# Patient Record
Sex: MALE | Race: WHITE
[De-identification: names, ages, dates, MRNs, and addresses within clinical notes are randomized per-mention and may not be internally consistent; named-entity substitution may affect disease eponyms.]

---

## 2017-02-02 ENCOUNTER — HOSPITAL ENCOUNTER (OUTPATIENT)
Dept: HOSPITAL 53 - M LAB REF | Age: 52
End: 2017-02-02
Attending: INTERNAL MEDICINE
Payer: COMMERCIAL

## 2017-02-02 DIAGNOSIS — C09.9: Primary | ICD-10-CM

## 2017-02-02 LAB — T4 FREE SERPL-MCNC: 0.9 NG/DL (ref 0.76–1.46)

## 2017-05-05 ENCOUNTER — HOSPITAL ENCOUNTER (OUTPATIENT)
Dept: HOSPITAL 53 - M LAB REF | Age: 52
End: 2017-05-05
Attending: PHYSICIAN ASSISTANT
Payer: COMMERCIAL

## 2017-05-05 DIAGNOSIS — Z79.899: Primary | ICD-10-CM

## 2017-05-05 LAB
ALT SERPL W P-5'-P-CCNC: 29 U/L (ref 12–78)
AST SERPL-CCNC: 40 U/L (ref 15–37)
BASOPHILS # BLD AUTO: 0 K/MM3 (ref 0–0.2)
BASOPHILS NFR BLD AUTO: 1.4 % (ref 0–1)
EOSINOPHIL # BLD AUTO: 0.2 K/MM3 (ref 0–0.5)
EOSINOPHIL NFR BLD AUTO: 6 % (ref 0–3)
ERYTHROCYTE [DISTWIDTH] IN BLOOD BY AUTOMATED COUNT: 12.6 % (ref 11.5–14.5)
LARGE UNSTAINED CELL #: 0.1 K/MM3 (ref 0–0.4)
LARGE UNSTAINED CELL %: 2.3 % (ref 0–4)
LYMPHOCYTES # BLD AUTO: 0.6 K/MM3 (ref 1.5–4.5)
LYMPHOCYTES NFR BLD AUTO: 17.1 % (ref 24–44)
MCH RBC QN AUTO: 33.3 PG (ref 27–33)
MCHC RBC AUTO-ENTMCNC: 33.9 G/DL (ref 32–36.5)
MCV RBC AUTO: 98.1 FL (ref 80–96)
MONOCYTES # BLD AUTO: 0.2 K/MM3 (ref 0–0.8)
MONOCYTES NFR BLD AUTO: 7 % (ref 0–5)
NEUTROPHILS # BLD AUTO: 1.9 K/MM3 (ref 1.8–7.7)
NEUTROPHILS NFR BLD AUTO: 66.1 % (ref 36–66)
PLATELET # BLD AUTO: 184 K/MM3 (ref 150–450)
WBC # BLD AUTO: 2.8 K/MM3 (ref 4–10)

## 2017-06-12 ENCOUNTER — HOSPITAL ENCOUNTER (OUTPATIENT)
Dept: HOSPITAL 53 - M OPP | Age: 52
End: 2017-06-12
Attending: INTERNAL MEDICINE
Payer: COMMERCIAL

## 2017-06-12 VITALS — BODY MASS INDEX: 19.66 KG/M2 | WEIGHT: 118 LBS | HEIGHT: 65 IN

## 2017-06-12 VITALS — SYSTOLIC BLOOD PRESSURE: 190 MMHG | DIASTOLIC BLOOD PRESSURE: 92 MMHG

## 2017-06-12 DIAGNOSIS — Z85.9: ICD-10-CM

## 2017-06-12 DIAGNOSIS — Z79.899: ICD-10-CM

## 2017-06-12 DIAGNOSIS — K64.0: ICD-10-CM

## 2017-06-12 DIAGNOSIS — Z92.21: ICD-10-CM

## 2017-06-12 DIAGNOSIS — Z87.891: ICD-10-CM

## 2017-06-12 DIAGNOSIS — Z12.11: Primary | ICD-10-CM

## 2017-06-12 DIAGNOSIS — Z92.3: ICD-10-CM

## 2017-06-12 DIAGNOSIS — R29.818: ICD-10-CM

## 2017-06-12 NOTE — ROOR
________________________________________________________________________________

Patient Name: Herrera Malone              Procedure Date: 6/12/2017 1:26 PM

MRN: [id number]                          Account Number: [id number]

YOB: 1965               Age: 52

Room: [facility]                            Gender: Male

Note Status: Finalized                 

________________________________________________________________________________

 

Procedure:           Total Colonoscopy to Cecum

Indications:         Screening for colorectal malignant neoplasm

Providers:           Juan Page MD

Referring MD:        Mandeep Chacon MD

Requesting Provider: 

Medicines:           Monitored Anesthesia Care

Complications:       No immediate complications.

________________________________________________________________________________

Procedure:           Pre-Anesthesia Assessment:

                     - The heart rate, respiratory rate, oxygen saturations, 

                     blood pressure, adequacy of pulmonary ventilation, and 

                     response to care were monitored throughout the procedure.

                     The Colonoscope was introduced through the anus and 

                     advanced to the cecum, identified by appendiceal orifice 

                     and ileocecal valve. The colonoscopy was performed 

                     without difficulty. The patient tolerated the procedure 

                     well. The quality of the bowel preparation was excellent.

                                                                                

Findings:

     The perianal and digital rectal examinations were normal.

     Non-bleeding internal hemorrhoids were found during retroflexion. The 

     hemorrhoids were small and Grade I (internal hemorrhoids that do not 

     prolapse).

     No other significant abnormalities were identified in a careful 

     examination of the remainder of the colon.

     The exam was otherwise without abnormality.

                                                                                

Impression:          - Non-bleeding internal hemorrhoids.

                     - The examination was otherwise normal.

                     - No specimens collected.

                     - The exam was otherwise normal to the cecum.

Recommendation:      - Patient has a contact number available for emergencies. 

                     The signs and symptoms of potential delayed complications 

                     were discussed with the patient. Return to normal 

                     activities tomorrow. Written discharge instructions were 

                     provided to the patient.

                     - Discharge patient to home.

                     - Continue present medications.

                     - Repeat colonoscopy in 10 years for screening purposes.

                     - Return to referring physician.

                     - The findings and recommendations were discussed with 

                     the patient's family.

                                                                                

 

Juan Page MD

____________________

Juan Page MD

6/12/2017 1:49:38 PM

This report has been signed electronically.

Number of Addenda: 0

 

Note Initiated On: 6/12/2017 1:26 PM

Estimated Blood Loss:

     Estimated blood loss: none.

## 2017-06-20 ENCOUNTER — HOSPITAL ENCOUNTER (OUTPATIENT)
Dept: HOSPITAL 53 - M LAB REF | Age: 52
End: 2017-06-20
Attending: INTERNAL MEDICINE
Payer: COMMERCIAL

## 2017-06-20 DIAGNOSIS — C09.9: Primary | ICD-10-CM

## 2017-06-20 LAB — T4 FREE SERPL-MCNC: 0.9 NG/DL (ref 0.76–1.46)

## 2017-08-15 ENCOUNTER — HOSPITAL ENCOUNTER (OUTPATIENT)
Dept: HOSPITAL 53 - M LABNEURO | Age: 52
End: 2017-08-15
Attending: FAMILY MEDICINE
Payer: COMMERCIAL

## 2017-08-15 DIAGNOSIS — I10: Primary | ICD-10-CM

## 2017-08-15 LAB
ALBUMIN SERPL BCG-MCNC: 4.2 GM/DL (ref 3.2–5.2)
ALBUMIN/GLOB SERPL: 1.24 {RATIO} (ref 1–1.93)
ALP SERPL-CCNC: 123 U/L (ref 45–117)
ALT SERPL W P-5'-P-CCNC: 39 U/L (ref 12–78)
ANION GAP SERPL CALC-SCNC: 6 MEQ/L (ref 8–16)
AST SERPL-CCNC: 37 U/L (ref 15–37)
BILIRUB SERPL-MCNC: 0.8 MG/DL (ref 0.2–1)
BUN SERPL-MCNC: 15 MG/DL (ref 7–18)
CALCIUM SERPL-MCNC: 9.2 MG/DL (ref 8.5–10.1)
CHLORIDE SERPL-SCNC: 93 MEQ/L (ref 98–107)
CHOLEST SERPL-MCNC: 173 MG/DL (ref ?–200)
CO2 SERPL-SCNC: 30 MEQ/L (ref 21–32)
CREAT SERPL-MCNC: 1.32 MG/DL (ref 0.7–1.3)
ERYTHROCYTE [DISTWIDTH] IN BLOOD BY AUTOMATED COUNT: 12.6 % (ref 11.5–14.5)
GFR SERPL CREATININE-BSD FRML MDRD: > 60 ML/MIN/{1.73_M2} (ref 56–?)
GLUCOSE SERPL-MCNC: 88 MG/DL (ref 70–105)
MCH RBC QN AUTO: 34.6 PG (ref 27–33)
MCHC RBC AUTO-ENTMCNC: 34.5 G/DL (ref 32–36.5)
MCV RBC AUTO: 100.3 FL (ref 80–96)
PLATELET # BLD AUTO: 166 K/MM3 (ref 150–450)
POTASSIUM SERPL-SCNC: 4.3 MEQ/L (ref 3.5–5.1)
PROT SERPL-MCNC: 7.6 GM/DL (ref 6.4–8.2)
SODIUM SERPL-SCNC: 129 MEQ/L (ref 136–145)
TRIGL SERPL-MCNC: 58 MG/DL (ref ?–150)
WBC # BLD AUTO: 4.2 K/MM3 (ref 4–10)

## 2018-07-24 ENCOUNTER — HOSPITAL ENCOUNTER (OUTPATIENT)
Dept: HOSPITAL 53 - M LABNEURO | Age: 53
End: 2018-07-24
Attending: PHYSICIAN ASSISTANT
Payer: COMMERCIAL

## 2018-07-24 DIAGNOSIS — G40.909: Primary | ICD-10-CM

## 2018-07-24 PROCEDURE — 36415 COLL VENOUS BLD VENIPUNCTURE: CPT

## 2018-07-27 LAB
LAMOTRIGINE SERPL-MCNC: 12.9 UG/ML (ref 2–20)
LEVETIRACETAM SERPL-MCNC: 40.7 UG/ML (ref 10–40)

## 2018-08-07 ENCOUNTER — HOSPITAL ENCOUNTER (OUTPATIENT)
Dept: HOSPITAL 53 - M LAB REF | Age: 53
End: 2018-08-07
Payer: COMMERCIAL

## 2018-08-07 DIAGNOSIS — E07.9: Primary | ICD-10-CM

## 2018-08-07 LAB
FREE T4: 0.81 NG/DL (ref 0.76–1.46)
THYROID STIMULATING HORMONE: 54 UIU/ML (ref 0.36–3.74)

## 2018-08-07 PROCEDURE — 84443 ASSAY THYROID STIM HORMONE: CPT

## 2018-08-14 ENCOUNTER — HOSPITAL ENCOUNTER (OUTPATIENT)
Dept: HOSPITAL 53 - M LAB REF | Age: 53
End: 2018-08-14
Payer: COMMERCIAL

## 2018-08-14 DIAGNOSIS — D64.9: Primary | ICD-10-CM

## 2018-08-14 LAB
FERRITIN: 189 NG/ML (ref 26–388)
HEMATOCRIT: 31.6 % (ref 42–52)
IRON (FE): 126 UG/DL (ref 65–175)
IRON SATN MFR SERPL: 39 % (ref 19.7–50)
RETIC HEMOGLOBIN EQUIVALENT: 38.6 PG (ref 24–36)
RETICS/RBC NFR: 0.6 % (ref 0.5–1.5)
RETICULOCYTE #: 19.1 10^9/L (ref 17–77)
SOURCE: (no result)
TOTAL IRON BINDING CAPACITY: 323 UG/DL (ref 250–450)
TOTAL PROTEIN: 7.5 GM/DL (ref 6.4–8.2)
VITAMIN B12 LEVEL: 319 PG/ML (ref 247–911)

## 2018-08-14 PROCEDURE — 83550 IRON BINDING TEST: CPT

## 2018-08-15 LAB
PRETREATED FOLATE FOR RBCFOL: 11.9 NG/ML
RBC FOLATE: 790.8 NG/ML (ref 280–791)

## 2018-08-16 LAB
ALBUMIN %: 62.9 % (ref 55.8–66.1)
ALBUMIN: 4.72 GM/DL (ref 3.29–5.55)
ALPHA-1-GLOBULIN %: 4 % (ref 2.9–4.9)
ALPHA-1-GLOBULINS: 0.3 GM/DL (ref 0.17–0.41)
ALPHA-2-GLOBULINS %: 8.9 % (ref 7.1–11.8)
ALPHA-2-GLOBULINS: 0.67 GM/DL (ref 0.42–0.99)
B-GLOBULIN SERPL ELPH-MCNC: 0.46 GM/DL (ref 0.28–0.6)
BETA1 GLOB MFR SERPL ELPH: 6.1 % (ref 4.7–7.2)
BETA2 GLOB MFR SERPL ELPH: 5.4 % (ref 3.2–6.5)
BETA2 GLOB SERPL ELPH-MCNC: 0.41 GM/DL (ref 0.19–0.55)
GAMMA GLOBULIN %: 12.7 % (ref 11.1–18.8)
GAMMA GLOBULINS: 0.95 GM/DL (ref 0.65–1.58)
SPEP INTERPRETATION: (no result)

## 2018-09-26 ENCOUNTER — HOSPITAL ENCOUNTER (OUTPATIENT)
Dept: HOSPITAL 53 - M LABNEURO | Age: 53
End: 2018-09-26
Attending: FAMILY MEDICINE
Payer: COMMERCIAL

## 2018-09-26 DIAGNOSIS — D64.9: Primary | ICD-10-CM

## 2018-09-26 LAB
FERRITIN: 321 NG/ML (ref 26–388)
FOLATE SERPL-MCNC: 6.3 NG/ML
FREE T3: 3.4 PG/ML (ref 2.2–4)
FREE T4: 1.19 NG/DL (ref 0.76–1.46)
IRON (FE): 142 UG/DL (ref 65–175)
IRON SATN MFR SERPL: 44.2 % (ref 19.7–50)
THYROID STIMULATING HORMONE: 6.51 UIU/ML (ref 0.36–3.74)
TOTAL IRON BINDING CAPACITY: 321 UG/DL (ref 250–450)
VIT B12 SERPL-MCNC: 339 PG/ML

## 2018-09-26 PROCEDURE — 82746 ASSAY OF FOLIC ACID SERUM: CPT

## 2019-04-30 ENCOUNTER — HOSPITAL ENCOUNTER (OUTPATIENT)
Dept: HOSPITAL 53 - M LABNEURO | Age: 54
End: 2019-04-30
Attending: FAMILY MEDICINE
Payer: COMMERCIAL

## 2019-04-30 ENCOUNTER — HOSPITAL ENCOUNTER (OUTPATIENT)
Dept: HOSPITAL 53 - M LABNEURO | Age: 54
End: 2019-04-30
Attending: PHYSICIAN ASSISTANT
Payer: COMMERCIAL

## 2019-04-30 DIAGNOSIS — E03.9: Primary | ICD-10-CM

## 2019-04-30 DIAGNOSIS — G40.89: Primary | ICD-10-CM

## 2019-04-30 LAB
T3FREE SERPL-MCNC: 2.6 PG/ML (ref 2.2–4)
T4 FREE SERPL-MCNC: 1.08 NG/DL (ref 0.76–1.46)
TSH SERPL DL<=0.005 MIU/L-ACNC: 10.4 UIU/ML (ref 0.36–3.74)

## 2019-05-03 LAB
LAMOTRIGINE SERPL-MCNC: 16.1 UG/ML (ref 2–20)
LEVETIRACETAM SERPL-MCNC: 27.3 UG/ML (ref 10–40)

## 2019-08-21 ENCOUNTER — HOSPITAL ENCOUNTER (OUTPATIENT)
Dept: HOSPITAL 53 - M RAD | Age: 54
End: 2019-08-21
Attending: INTERNAL MEDICINE
Payer: COMMERCIAL

## 2019-08-21 DIAGNOSIS — Z12.2: Primary | ICD-10-CM

## 2019-08-21 DIAGNOSIS — F17.210: ICD-10-CM

## 2019-08-21 NOTE — REP
REASON FOR EXAM: Tobacco abuse.

 

PRIORS: None.

 

As per the protocol only lung window images were sent to the read station for

interpretation.

 

There is a nodule in the right lung base which measures approximately 2.2 cm.

When CT images from the CT/PET of 10/19/2016 are reviewed this area represents a

change.

 

IMPRESSION:

 

There is a right lower lobe nodule as described above.  Diagnostic chest CT is

warranted as per the revised Fleischner's Society criteria.

 

 

Electronically Signed by

Ori Mckinley DO 08/21/2019 02:03 P

## 2019-09-03 ENCOUNTER — HOSPITAL ENCOUNTER (OUTPATIENT)
Dept: HOSPITAL 53 - M PLARAD | Age: 54
End: 2019-09-03
Attending: INTERNAL MEDICINE
Payer: COMMERCIAL

## 2019-09-03 DIAGNOSIS — R91.1: Primary | ICD-10-CM

## 2019-09-03 PROCEDURE — 78815 PET IMAGE W/CT SKULL-THIGH: CPT

## 2019-09-04 NOTE — REP
PET/CT:

 

History: Solitary pulmonary nodule. 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

tonsil.

 

Comparisons: Comparison PET/CT  studies are dated October 19, 2016, January 15,

2015, and July 3, 2014. Comparison low-dose chest CT study is from August 21, 2019.  This chest CT showed a 2.2 cm nodular opacity in the right base.

 

TECHNIQUE:

 

64 minutes following the intravenous injection of a 8.25 mCi dose of F-18 FDG,

three-dimensional PET scintigraphy is acquired from the skull base to the

proximal thighs. Triplanar noncontrast CT scanning is acquired through the same

anatomic range for attenuation correction, and image registration with scan

parameters optimized to minimize radiation exposure to the patient. PET

scintigraphy and CT datasets were fused and displayed on a workstation with

multiplanar and projection display capability.

 

PET/CT Findings: The patient is status post radiation therapy to the head and

neck and right neck dissection.  Postoperative fibrotic changes are seen but no

abnormal hypermetabolic uptake is seen in the head and neck region.  There is

some mild skeletal muscle uptake.

 

On today's CT study there is a subtle alveolar infiltrate with multiple

ground-glass opacities in the right upper lobe which is new when compared with

the recent low-dose chest CT and consistent with pneumonia.  There is

non-hypermetabolic but discernible FDG accumulation in this infiltrate, maximum

standard uptake value 2.41.  The ill-defined area of increased density in the

right lower lobe posteriorly appears less nodular and more like an infiltrate

today.  It does show mildly hypermetabolic uptake, maximum standard uptake value

2.72.  No other abnormal hypermetabolic uptake is seen within the chest.

 

In the abdomen and pelvis, there is normal hepatic, splenic, gastrointestinal,

and genitourinary FDG distribution.  No abnormal hypermetabolic uptake is seen in

the abdomen or pelvis.

 

Impression:

 

Right upper and lower lobe opacities with low level FDG accumulation most

compatible with inflammatory disease.  The right lower lobe opacity is mildly

hypermetabolic.  Recommend followup CT scanning.  Post-treatment changes are

noted in the neck.

 

 

Electronically Signed by

Chandan Ayala MD 09/04/2019 12:10 P

## 2019-10-31 ENCOUNTER — HOSPITAL ENCOUNTER (OUTPATIENT)
Dept: HOSPITAL 53 - M WUC | Age: 54
End: 2019-10-31
Attending: PHYSICIAN ASSISTANT
Payer: COMMERCIAL

## 2019-10-31 DIAGNOSIS — R56.9: Primary | ICD-10-CM

## 2019-10-31 DIAGNOSIS — Z79.899: ICD-10-CM

## 2019-11-03 LAB
LAMOTRIGINE SERPL-MCNC: 12.4 UG/ML (ref 2–20)
LEVETIRACETAM SERPL-MCNC: 24.6 UG/ML (ref 10–40)

## 2019-11-08 ENCOUNTER — HOSPITAL ENCOUNTER (OUTPATIENT)
Dept: HOSPITAL 53 - M WUC | Age: 54
End: 2019-11-08
Attending: FAMILY MEDICINE
Payer: COMMERCIAL

## 2019-11-08 DIAGNOSIS — E03.9: Primary | ICD-10-CM

## 2019-11-08 DIAGNOSIS — I10: ICD-10-CM

## 2019-11-08 LAB
ALBUMIN SERPL BCG-MCNC: 3.9 GM/DL (ref 3.2–5.2)
ALT SERPL W P-5'-P-CCNC: 16 U/L (ref 12–78)
BASOPHILS # BLD AUTO: 0.1 10^3/UL (ref 0–0.2)
BASOPHILS NFR BLD AUTO: 2.4 % (ref 0–1)
BILIRUB SERPL-MCNC: 0.8 MG/DL (ref 0.2–1)
BUN SERPL-MCNC: 12 MG/DL (ref 7–18)
CALCIUM SERPL-MCNC: 9 MG/DL (ref 8.5–10.1)
CHLORIDE SERPL-SCNC: 97 MEQ/L (ref 98–107)
CHOLEST SERPL-MCNC: 186 MG/DL (ref ?–200)
CHOLEST/HDLC SERPL: 1.52 {RATIO} (ref ?–5)
CO2 SERPL-SCNC: 33 MEQ/L (ref 21–32)
CREAT SERPL-MCNC: 1.16 MG/DL (ref 0.7–1.3)
EOSINOPHIL # BLD AUTO: 0.1 10^3/UL (ref 0–0.5)
EOSINOPHIL NFR BLD AUTO: 3 % (ref 0–3)
GFR SERPL CREATININE-BSD FRML MDRD: > 60 ML/MIN/{1.73_M2} (ref 56–?)
GLUCOSE SERPL-MCNC: 83 MG/DL (ref 70–100)
HCT VFR BLD AUTO: 29.9 % (ref 42–52)
HDLC SERPL-MCNC: 122 MG/DL (ref 40–?)
HGB BLD-MCNC: 10.2 G/DL (ref 13.5–17.5)
LDLC SERPL CALC-MCNC: 55 MG/DL (ref ?–100)
LYMPHOCYTES # BLD AUTO: 0.7 10^3/UL (ref 1.5–5)
LYMPHOCYTES NFR BLD AUTO: 20.2 % (ref 24–44)
MCH RBC QN AUTO: 32.8 PG (ref 27–33)
MCHC RBC AUTO-ENTMCNC: 34.1 G/DL (ref 32–36.5)
MCV RBC AUTO: 96.1 FL (ref 80–96)
MONOCYTES # BLD AUTO: 0.4 10^3/UL (ref 0–0.8)
MONOCYTES NFR BLD AUTO: 10.5 % (ref 0–5)
NEUTROPHILS # BLD AUTO: 2.1 10^3/UL (ref 1.5–8.5)
NEUTROPHILS NFR BLD AUTO: 63.6 % (ref 36–66)
NONHDLC SERPL-MCNC: 64 MG/DL
PLATELET # BLD AUTO: 140 10^3/UL (ref 150–450)
POTASSIUM SERPL-SCNC: 4.2 MEQ/L (ref 3.5–5.1)
PROT SERPL-MCNC: 7.4 GM/DL (ref 6.4–8.2)
RBC # BLD AUTO: 3.11 10^6/UL (ref 4.3–6.1)
SODIUM SERPL-SCNC: 134 MEQ/L (ref 136–145)
T3FREE SERPL-MCNC: 2.4 PG/ML (ref 2.2–4)
T4 FREE SERPL-MCNC: 0.96 NG/DL (ref 0.76–1.46)
TRIGL SERPL-MCNC: 46 MG/DL (ref ?–150)
WBC # BLD AUTO: 3.3 10^3/UL (ref 4–10)

## 2019-12-05 ENCOUNTER — HOSPITAL ENCOUNTER (OUTPATIENT)
Dept: HOSPITAL 53 - M RAD | Age: 54
End: 2019-12-05
Attending: INTERNAL MEDICINE
Payer: COMMERCIAL

## 2019-12-05 DIAGNOSIS — Z85.89: Primary | ICD-10-CM

## 2019-12-05 PROCEDURE — 71260 CT THORAX DX C+: CPT

## 2019-12-05 NOTE — REP
Clinical:  History of head neck cancer.

 

Technique:  Axial contrast enhanced images from the thoracic inlet to the upper

abdomen with coronal and sagittal re-formations.

 

Comparison:  08/21/2019.

 

Findings:

 

Lung fields are clear and the previously noted 2.2 cm right lower lobe opacity

has resolved.  No consolidation, significant nodule or mass lesion.  No pleural

effusion.  No pneumothorax.  Tracheobronchial tree is patent.  No axillary,

hilar, or mediastinal adenopathy.  Further evaluation of the mediastinum

demonstrates relatively stable/normal appearance to the thoracic aorta, pulmonary

vasculature and heart/pericardium.  Atherosclerotic changes of the aorta and

coronary arteries again noted.

 

Surrounding musculoskeletal structures demonstrate no obvious acute lesion.  Left

clavicle demonstrates old fracture and pseudoarthrosis. Mild chronic-appearing

compression deformities at T12 and L1 noted.

 

Impression:

1.  No acute mediastinal or pleuroparenchymal process appreciated.

2.  Previously right lower lobe density has resolved.

 

 

Electronically Signed by

Natan Hensley MD 12/05/2019 09:20 A

## 2020-04-24 ENCOUNTER — HOSPITAL ENCOUNTER (OUTPATIENT)
Dept: HOSPITAL 53 - M RAD | Age: 55
End: 2020-04-24
Attending: INTERNAL MEDICINE
Payer: MEDICAID

## 2020-04-24 DIAGNOSIS — C09.9: Primary | ICD-10-CM

## 2020-04-24 PROCEDURE — 70491 CT SOFT TISSUE NECK W/DYE: CPT

## 2020-04-24 PROCEDURE — 70470 CT HEAD/BRAIN W/O & W/DYE: CPT

## 2020-04-24 PROCEDURE — 71260 CT THORAX DX C+: CPT

## 2020-04-24 NOTE — REP
CT SOFT TISSUES NECK WITH IV CONTRAST:

 

CT soft tissues neck performed following the intravenous administration of 100 mL

of Isovue-370. Sagittal and coronal reconstruction images are performed.

 

Comparison is made with  prior PET/CT 09/03/2019 and CT neck 08/08/2014 and

12/30/2014.

 

Since the prior CT of the neck, 12/30/2014, the patient has had right neck

surgery.

 

There is thickening of the right lateral nasopharyngeal and oropharyngeal soft

tissues. Laryngeal region appears unremarkable. Epiglottis is unremarkable.

Retropharyngeal space is unremarkable. The right submandibular gland is absent.

No definite thyroid nodule is seen. In the left neck, there is no evidence of

lymphadenopathy. In the right neck, there is ill-defined soft tissue in the right

parapharyngeal region extending along the carotid and jugular vessels extending

into a right lateral subcutaneous location. The appearance is unchanged compared

to the prior PET/CT of 09/03/2019. At that time, there was no hypermetabolic

uptake in the neck soft tissues. Ill-defined soft tissue in the right external

auditory canal could represent cerumen. Subcutaneous soft tissue density along

the right zygomatic arch measures 1 cm in diameter, likely representing an

epidermal inclusion cyst. There is chronic opacification of right mastoid air

cells. There is mild mucosal thickening in the inferior right maxillary sinus.

Incidental note is made of an old fracture of the right transverse process of the

C1 vertebral body. This is seen on the PET/CT of 09/03/2019. It was not present

on the PET/CT of 10/19/2016. Otherwise, there are diffuse degenerative changes of

the cervical spine.

 

IMPRESSION:

 

Postsurgical changes right neck appear similar to PET/CT of 09/03/2019, as

discussed in detail above.

 

Nondisplaced fracture right transverse process of the C1 vertebral body is

unchanged since PET/CT 09/03/2019. It was not seen on the PET/CT of 10/19/2016.

 

Preliminary report provided by [facility] at the time of the exam.

 

 

Electronically Signed by

Chepe Tapia MD 04/24/2020 07:26 P

## 2020-04-24 NOTE — REP
REASON:  History of head and neck carcinoma.

 

All prior chest CTs were reviewed, the latest 12/05/2019.

 

CONTRAST TODAY:  100 mL Isovue-370.

 

The mediastinum and pulmonary veronica are again seen to be within normal limits.

There are no pleural or pericardial effusions.

 

The imaged upper abdomen again shows para-aortic nodular densities, at least in

part, probably represent vascular structures, however, true nodules cannot be

ruled out. They are completely unchanged from the prior exam. Bone window

technique throughout the examination shows the osseous structures to be stable

and intact.

 

Evaluation of the lung fields shows no new abnormal nodules, masses, or

opacities.

 

IMPRESSION:

 

Stable CT findings as described above. There is no evidence of acute disease.

 

 

Electronically Signed by

Ori Mckinley DO 04/24/2020 02:58 P

## 2020-04-24 NOTE — REP
CT BRAIN WITH AND WITHOUT CONTRAST:

 

CT brain performed prior to and following the intravenous administration of 100

mL Isovue-370.

 

Comparison made with prior PET/CT, 09/03/2019 and CT brain 06/11/2011, MRI brain

06/13/2011.

 

There is moderate atrophy. There is no midline shift or mass effect. There are

chronic periventricular small vessel ischemic changes in the white matter

bilaterally. There is an old small lacunar infarct in the right basal ganglia

region. There is mild encephalomalacia in the left temporal lobe, likely from

prior infarct. No acute intracranial hemorrhage or extra-axial fluid collection

is seen. There is no enhancing mass or evidence of intracranial metastatic

disease. Bone window examination demonstrates vascular calcifications in the

carotid siphons. There is chronic opacification of right mastoid air cells.

Nonspecific soft tissue is seen in the right external auditory canal. Incidental

note is made of a superficial subcutaneous soft tissue density in the right

lateral infraorbital soft tissues, likely representing an epidermal inclusion

cyst.

 

IMPRESSION:

 

No acute intracranial abnormality. No acute hemorrhage or evidence of

intracranial metastatic disease. There are chronic atrophic changes and chronic

ischemic changes, as discussed above. There is chronic opacification of right

mastoid air cells.

 

Preliminary report provided by [facility] at the time of the exam.

 

 

Electronically Signed by

Chepe Tapia MD 04/24/2020 07:25 P

## 2020-04-29 ENCOUNTER — HOSPITAL ENCOUNTER (OUTPATIENT)
Dept: HOSPITAL 53 - M WUC | Age: 55
End: 2020-04-29
Attending: FAMILY MEDICINE
Payer: COMMERCIAL

## 2020-04-29 DIAGNOSIS — E03.9: Primary | ICD-10-CM

## 2020-04-29 LAB
T3FREE SERPL-MCNC: 2.4 PG/ML (ref 2.2–4)
T4 FREE SERPL-MCNC: 1.23 NG/DL (ref 0.76–1.46)
TSH SERPL DL<=0.005 MIU/L-ACNC: 8.47 UIU/ML (ref 0.36–3.74)

## 2020-05-18 ENCOUNTER — HOSPITAL ENCOUNTER (OUTPATIENT)
Dept: HOSPITAL 53 - M WUC | Age: 55
End: 2020-05-18
Attending: INTERNAL MEDICINE
Payer: MEDICAID

## 2020-05-18 DIAGNOSIS — C09.9: Primary | ICD-10-CM

## 2020-05-18 LAB
ALBUMIN SERPL BCG-MCNC: 4.4 GM/DL (ref 3.2–5.2)
ALT SERPL W P-5'-P-CCNC: 25 U/L (ref 12–78)
BASOPHILS # BLD AUTO: 0.1 10^3/UL (ref 0–0.2)
BASOPHILS NFR BLD AUTO: 2.2 % (ref 0–1)
BILIRUB SERPL-MCNC: 0.9 MG/DL (ref 0.2–1)
BUN SERPL-MCNC: 16 MG/DL (ref 7–18)
CALCIUM SERPL-MCNC: 9.6 MG/DL (ref 8.5–10.1)
CHLORIDE SERPL-SCNC: 94 MEQ/L (ref 98–107)
CO2 SERPL-SCNC: 29 MEQ/L (ref 21–32)
CREAT SERPL-MCNC: 1.27 MG/DL (ref 0.7–1.3)
EOSINOPHIL # BLD AUTO: 0.2 10^3/UL (ref 0–0.5)
EOSINOPHIL NFR BLD AUTO: 4.5 % (ref 0–3)
GFR SERPL CREATININE-BSD FRML MDRD: > 60 ML/MIN/{1.73_M2} (ref 56–?)
GLUCOSE SERPL-MCNC: 80 MG/DL (ref 70–100)
HCT VFR BLD AUTO: 36.7 % (ref 42–52)
HGB BLD-MCNC: 12.2 G/DL (ref 13.5–17.5)
LYMPHOCYTES # BLD AUTO: 0.8 10^3/UL (ref 1.5–5)
LYMPHOCYTES NFR BLD AUTO: 18.3 % (ref 24–44)
MCH RBC QN AUTO: 31.8 PG (ref 27–33)
MCHC RBC AUTO-ENTMCNC: 33.2 G/DL (ref 32–36.5)
MCV RBC AUTO: 95.6 FL (ref 80–96)
MONOCYTES # BLD AUTO: 0.5 10^3/UL (ref 0–0.8)
MONOCYTES NFR BLD AUTO: 11.4 % (ref 0–5)
NEUTROPHILS # BLD AUTO: 2.8 10^3/UL (ref 1.5–8.5)
NEUTROPHILS NFR BLD AUTO: 63.4 % (ref 36–66)
PLATELET # BLD AUTO: 195 10^3/UL (ref 150–450)
POTASSIUM SERPL-SCNC: 4.7 MEQ/L (ref 3.5–5.1)
PROT SERPL-MCNC: 7.9 GM/DL (ref 6.4–8.2)
RBC # BLD AUTO: 3.84 10^6/UL (ref 4.3–6.1)
SODIUM SERPL-SCNC: 132 MEQ/L (ref 136–145)
WBC # BLD AUTO: 4.5 10^3/UL (ref 4–10)

## 2020-08-18 ENCOUNTER — HOSPITAL ENCOUNTER (OUTPATIENT)
Dept: HOSPITAL 53 - M WUC | Age: 55
End: 2020-08-18
Attending: PHYSICIAN ASSISTANT
Payer: COMMERCIAL

## 2020-08-18 DIAGNOSIS — R56.9: Primary | ICD-10-CM

## 2020-09-16 ENCOUNTER — HOSPITAL ENCOUNTER (OUTPATIENT)
Dept: HOSPITAL 53 - M WUC | Age: 55
End: 2020-09-16
Attending: PHYSICIAN ASSISTANT
Payer: MEDICAID

## 2020-09-16 DIAGNOSIS — G40.89: Primary | ICD-10-CM

## 2020-09-22 LAB
LAMOTRIGINE SERPL-MCNC: (no result) UG/ML
LEVETIRACETAM SERPL-MCNC: (no result) UG/ML

## 2020-10-13 ENCOUNTER — HOSPITAL ENCOUNTER (OUTPATIENT)
Dept: HOSPITAL 53 - M WUC | Age: 55
End: 2020-10-13
Attending: INTERNAL MEDICINE
Payer: COMMERCIAL

## 2020-10-13 DIAGNOSIS — C09.9: Primary | ICD-10-CM

## 2020-10-13 LAB
ALBUMIN SERPL BCG-MCNC: 4 GM/DL (ref 3.2–5.2)
ALT SERPL W P-5'-P-CCNC: 18 U/L (ref 12–78)
BASOPHILS # BLD AUTO: 0.1 10^3/UL (ref 0–0.2)
BASOPHILS NFR BLD AUTO: 1.3 % (ref 0–1)
BILIRUB SERPL-MCNC: 1.1 MG/DL (ref 0.2–1)
BUN SERPL-MCNC: 23 MG/DL (ref 7–18)
CALCIUM SERPL-MCNC: 9.5 MG/DL (ref 8.5–10.1)
CHLORIDE SERPL-SCNC: 88 MEQ/L (ref 98–107)
CO2 SERPL-SCNC: 30 MEQ/L (ref 21–32)
CREAT SERPL-MCNC: 2.27 MG/DL (ref 0.7–1.3)
EOSINOPHIL # BLD AUTO: 0.2 10^3/UL (ref 0–0.5)
EOSINOPHIL NFR BLD AUTO: 4.6 % (ref 0–3)
FERRITIN SERPL-MCNC: 510 NG/ML (ref 26–388)
FOLATE SERPL-MCNC: 7.3 NG/ML (ref 5.4–?)
GFR SERPL CREATININE-BSD FRML MDRD: 32 ML/MIN/{1.73_M2} (ref 56–?)
GLUCOSE SERPL-MCNC: 110 MG/DL (ref 70–100)
HCT VFR BLD AUTO: 33.4 % (ref 42–52)
HGB BLD-MCNC: 11.3 G/DL (ref 13.5–17.5)
IRON SATN MFR SERPL: 25.6 % (ref 19.7–50)
IRON SERPL-MCNC: 60 UG/DL (ref 65–175)
LYMPHOCYTES # BLD AUTO: 0.5 10^3/UL (ref 1.5–5)
LYMPHOCYTES NFR BLD AUTO: 12.9 % (ref 24–44)
MCH RBC QN AUTO: 33 PG (ref 27–33)
MCHC RBC AUTO-ENTMCNC: 33.8 G/DL (ref 32–36.5)
MCV RBC AUTO: 97.7 FL (ref 80–96)
MONOCYTES # BLD AUTO: 0.5 10^3/UL (ref 0–0.8)
MONOCYTES NFR BLD AUTO: 11.9 % (ref 0–5)
NEUTROPHILS # BLD AUTO: 2.7 10^3/UL (ref 1.5–8.5)
NEUTROPHILS NFR BLD AUTO: 69 % (ref 36–66)
PLATELET # BLD AUTO: 119 10^3/UL (ref 150–450)
POTASSIUM SERPL-SCNC: 3.7 MEQ/L (ref 3.5–5.1)
PROT SERPL-MCNC: 7.4 GM/DL (ref 6.4–8.2)
RBC # BLD AUTO: 3.42 10^6/UL (ref 4.3–6.1)
SODIUM SERPL-SCNC: 127 MEQ/L (ref 136–145)
TIBC SERPL-MCNC: 234 UG/DL (ref 250–450)
VIT B12 SERPL-MCNC: 1371 PG/ML (ref 247–911)
WBC # BLD AUTO: 4 10^3/UL (ref 4–10)

## 2020-10-28 ENCOUNTER — HOSPITAL ENCOUNTER (OUTPATIENT)
Dept: HOSPITAL 53 - M WUC | Age: 55
End: 2020-10-28
Attending: FAMILY MEDICINE
Payer: COMMERCIAL

## 2020-10-28 DIAGNOSIS — E03.9: Primary | ICD-10-CM

## 2020-10-28 LAB
T3FREE SERPL-MCNC: 2.5 PG/ML (ref 2.2–4)
T4 FREE SERPL-MCNC: 1.1 NG/DL (ref 0.76–1.46)
TSH SERPL DL<=0.005 MIU/L-ACNC: 9.97 UIU/ML (ref 0.36–3.74)

## 2020-12-08 ENCOUNTER — HOSPITAL ENCOUNTER (OUTPATIENT)
Dept: HOSPITAL 53 - M WUC | Age: 55
End: 2020-12-08
Attending: FAMILY MEDICINE
Payer: COMMERCIAL

## 2020-12-08 DIAGNOSIS — E03.9: Primary | ICD-10-CM

## 2020-12-08 LAB
T3FREE SERPL-MCNC: 2.4 PG/ML (ref 2.2–4)
T4 FREE SERPL-MCNC: 1.08 NG/DL (ref 0.76–1.46)
TSH SERPL DL<=0.005 MIU/L-ACNC: 3.64 UIU/ML (ref 0.36–3.74)

## 2021-02-23 ENCOUNTER — HOSPITAL ENCOUNTER (OUTPATIENT)
Dept: HOSPITAL 53 - M WUC | Age: 56
End: 2021-02-23
Attending: PHYSICIAN ASSISTANT
Payer: COMMERCIAL

## 2021-02-23 DIAGNOSIS — R56.9: Primary | ICD-10-CM

## 2021-04-13 ENCOUNTER — HOSPITAL ENCOUNTER (OUTPATIENT)
Dept: HOSPITAL 53 - M RAD | Age: 56
End: 2021-04-13
Attending: INTERNAL MEDICINE
Payer: COMMERCIAL

## 2021-04-13 DIAGNOSIS — C02.4: Primary | ICD-10-CM

## 2021-04-13 DIAGNOSIS — F17.200: ICD-10-CM

## 2021-04-14 NOTE — REP
INDICATION:

NICOTINE DEPENDENCE, UNSPECIFIED, UNCOMPLICATED



COMPARISON:

08/21/2019



TECHNIQUE:

Axial noncontrast images from the thoracic inlet to the upper abdomen using low-dose

lung screening technique (LDCT).



FINDINGS:

Bilateral lung fields are well aerated and essentially clear.  No focal consolidation,

suspicious nodule, or mass.  The vague density along the right lower lobe

diaphragmatic surface on 08/21/2019 is no longer present and may have represented

transient atelectasis.  No pleural effusion or pneumothorax.  Tracheobronchial tree is

patent.



IMPRESSION:

Lung-RADS category 1.  No suspicious nodule or mass lesion.

Management recommendations include annual low-dose CT surveillance.





<Electronically signed by Natan Hensley > 04/14/21 0728

## 2021-08-17 ENCOUNTER — HOSPITAL ENCOUNTER (OUTPATIENT)
Dept: HOSPITAL 53 - M WUC | Age: 56
End: 2021-08-17
Attending: PHYSICIAN ASSISTANT
Payer: COMMERCIAL

## 2021-08-17 DIAGNOSIS — R56.9: Primary | ICD-10-CM

## 2021-11-05 ENCOUNTER — HOSPITAL ENCOUNTER (OUTPATIENT)
Dept: HOSPITAL 53 - M WUC | Age: 56
End: 2021-11-05
Attending: FAMILY MEDICINE
Payer: COMMERCIAL

## 2021-11-05 DIAGNOSIS — E03.9: Primary | ICD-10-CM

## 2021-11-05 DIAGNOSIS — I10: ICD-10-CM

## 2021-11-05 LAB
ALBUMIN SERPL BCG-MCNC: 3.9 GM/DL (ref 3.2–5.2)
ALT SERPL W P-5'-P-CCNC: 20 U/L (ref 12–78)
BILIRUB SERPL-MCNC: 0.6 MG/DL (ref 0.2–1)
BUN SERPL-MCNC: 11 MG/DL (ref 7–18)
CALCIUM SERPL-MCNC: 9.4 MG/DL (ref 8.5–10.1)
CHLORIDE SERPL-SCNC: 97 MEQ/L (ref 98–107)
CHOLEST SERPL-MCNC: 168 MG/DL (ref ?–200)
CHOLEST/HDLC SERPL: 1.91 {RATIO} (ref ?–5)
CO2 SERPL-SCNC: 31 MEQ/L (ref 21–32)
CREAT SERPL-MCNC: 1.19 MG/DL (ref 0.7–1.3)
GFR SERPL CREATININE-BSD FRML MDRD: > 60 ML/MIN/{1.73_M2} (ref 56–?)
GLUCOSE SERPL-MCNC: 102 MG/DL (ref 70–100)
HCT VFR BLD AUTO: 33.2 % (ref 42–52)
HDLC SERPL-MCNC: 88 MG/DL (ref 40–?)
HGB BLD-MCNC: 11.3 G/DL (ref 13.5–17.5)
LDLC SERPL CALC-MCNC: 66 MG/DL (ref ?–100)
MCH RBC QN AUTO: 32.8 PG (ref 27–33)
MCHC RBC AUTO-ENTMCNC: 34 G/DL (ref 32–36.5)
MCV RBC AUTO: 96.5 FL (ref 80–96)
NONHDLC SERPL-MCNC: 80 MG/DL
PLATELET # BLD AUTO: 197 10^3/UL (ref 150–450)
POTASSIUM SERPL-SCNC: 4.4 MEQ/L (ref 3.5–5.1)
PROT SERPL-MCNC: 7.2 GM/DL (ref 6.4–8.2)
RBC # BLD AUTO: 3.44 10^6/UL (ref 4.3–6.1)
SODIUM SERPL-SCNC: 133 MEQ/L (ref 136–145)
T3FREE SERPL-MCNC: 2.7 PG/ML (ref 2.2–4)
T4 FREE SERPL-MCNC: 1.38 NG/DL (ref 0.76–1.46)
TRIGL SERPL-MCNC: 72 MG/DL (ref ?–150)
TSH SERPL DL<=0.005 MIU/L-ACNC: 0.49 UIU/ML (ref 0.36–3.74)
WBC # BLD AUTO: 4.7 10^3/UL (ref 4–10)

## 2021-12-06 ENCOUNTER — HOSPITAL ENCOUNTER (OUTPATIENT)
Dept: HOSPITAL 53 - M LAB REF | Age: 56
End: 2021-12-06
Attending: OTOLARYNGOLOGY
Payer: COMMERCIAL

## 2021-12-06 DIAGNOSIS — L72.3: Primary | ICD-10-CM

## 2022-05-03 ENCOUNTER — HOSPITAL ENCOUNTER (OUTPATIENT)
Dept: HOSPITAL 53 - M RAD | Age: 57
End: 2022-05-03
Attending: INTERNAL MEDICINE
Payer: COMMERCIAL

## 2022-05-03 DIAGNOSIS — Z12.2: Primary | ICD-10-CM

## 2022-05-03 DIAGNOSIS — F17.200: ICD-10-CM

## 2022-10-11 ENCOUNTER — HOSPITAL ENCOUNTER (OUTPATIENT)
Dept: HOSPITAL 53 - M WUC | Age: 57
End: 2022-10-11
Attending: NURSE PRACTITIONER
Payer: COMMERCIAL

## 2022-10-11 DIAGNOSIS — C09.9: Primary | ICD-10-CM

## 2022-10-11 LAB
ALBUMIN SERPL BCG-MCNC: 4 GM/DL (ref 3.2–5.2)
ALT SERPL W P-5'-P-CCNC: 21 U/L (ref 12–78)
BASOPHILS # BLD AUTO: 0.1 10^3/UL (ref 0–0.2)
BASOPHILS NFR BLD AUTO: 1.8 % (ref 0–1)
BILIRUB SERPL-MCNC: 0.7 MG/DL (ref 0.2–1)
BUN SERPL-MCNC: 13 MG/DL (ref 7–18)
CALCIUM SERPL-MCNC: 9.1 MG/DL (ref 8.5–10.1)
CHLORIDE SERPL-SCNC: 91 MEQ/L (ref 98–107)
CO2 SERPL-SCNC: 30 MEQ/L (ref 21–32)
CREAT SERPL-MCNC: 1.68 MG/DL (ref 0.7–1.3)
EOSINOPHIL # BLD AUTO: 0.1 10^3/UL (ref 0–0.5)
EOSINOPHIL NFR BLD AUTO: 2.5 % (ref 0–3)
GFR SERPL CREATININE-BSD FRML MDRD: 44.9 ML/MIN/{1.73_M2} (ref 56–?)
GLUCOSE SERPL-MCNC: 92 MG/DL (ref 70–100)
HCT VFR BLD AUTO: 30.8 % (ref 42–52)
HGB BLD-MCNC: 10.6 G/DL (ref 13.5–17.5)
LYMPHOCYTES # BLD AUTO: 0.7 10^3/UL (ref 1.5–5)
LYMPHOCYTES NFR BLD AUTO: 16 % (ref 24–44)
MCH RBC QN AUTO: 33.2 PG (ref 27–33)
MCHC RBC AUTO-ENTMCNC: 34.4 G/DL (ref 32–36.5)
MCV RBC AUTO: 96.6 FL (ref 80–96)
MONOCYTES # BLD AUTO: 0.5 10^3/UL (ref 0–0.8)
MONOCYTES NFR BLD AUTO: 11 % (ref 2–8)
NEUTROPHILS # BLD AUTO: 3 10^3/UL (ref 1.5–8.5)
NEUTROPHILS NFR BLD AUTO: 68.2 % (ref 36–66)
PLATELET # BLD AUTO: 139 10^3/UL (ref 150–450)
POTASSIUM SERPL-SCNC: 4.2 MEQ/L (ref 3.5–5.1)
PROT SERPL-MCNC: 7.3 GM/DL (ref 6.4–8.2)
RBC # BLD AUTO: 3.19 10^6/UL (ref 4.3–6.1)
SODIUM SERPL-SCNC: 129 MEQ/L (ref 136–145)
WBC # BLD AUTO: 4.4 10^3/UL (ref 4–10)

## 2022-11-04 ENCOUNTER — HOSPITAL ENCOUNTER (OUTPATIENT)
Dept: HOSPITAL 53 - M WUC | Age: 57
End: 2022-11-04
Attending: FAMILY MEDICINE
Payer: COMMERCIAL

## 2022-11-04 DIAGNOSIS — I10: ICD-10-CM

## 2022-11-04 DIAGNOSIS — Z12.5: Primary | ICD-10-CM

## 2022-11-04 DIAGNOSIS — E03.9: ICD-10-CM

## 2022-11-04 DIAGNOSIS — D64.9: ICD-10-CM

## 2022-11-04 LAB
ALBUMIN SERPL BCG-MCNC: 4 GM/DL (ref 3.2–5.2)
ALT SERPL W P-5'-P-CCNC: 24 U/L (ref 12–78)
BILIRUB SERPL-MCNC: 0.6 MG/DL (ref 0.2–1)
BUN SERPL-MCNC: 12 MG/DL (ref 7–18)
CALCIUM SERPL-MCNC: 9.1 MG/DL (ref 8.5–10.1)
CHLORIDE SERPL-SCNC: 96 MEQ/L (ref 98–107)
CHOLEST SERPL-MCNC: 170 MG/DL (ref ?–200)
CHOLEST/HDLC SERPL: 1.4 {RATIO} (ref ?–5)
CO2 SERPL-SCNC: 31 MEQ/L (ref 21–32)
CREAT SERPL-MCNC: 1.39 MG/DL (ref 0.7–1.3)
FOLATE SERPL-MCNC: 3.6 NG/ML (ref 5.4–?)
GFR SERPL CREATININE-BSD FRML MDRD: 55.9 ML/MIN/{1.73_M2} (ref 56–?)
GLUCOSE SERPL-MCNC: 108 MG/DL (ref 70–100)
HCT VFR BLD AUTO: 32.4 % (ref 42–52)
HDLC SERPL-MCNC: 121 MG/DL (ref 40–?)
HGB BLD-MCNC: 10.9 G/DL (ref 13.5–17.5)
LDLC SERPL CALC-MCNC: 37 MG/DL (ref ?–100)
MCH RBC QN AUTO: 33.6 PG (ref 27–33)
MCHC RBC AUTO-ENTMCNC: 33.6 G/DL (ref 32–36.5)
MCV RBC AUTO: 100 FL (ref 80–96)
NONHDLC SERPL-MCNC: 49 MG/DL
PLATELET # BLD AUTO: 139 10^3/UL (ref 150–450)
POTASSIUM SERPL-SCNC: 4.1 MEQ/L (ref 3.5–5.1)
PROT SERPL-MCNC: 7.5 GM/DL (ref 6.4–8.2)
PSA SERPL-MCNC: 4.57 NG/ML (ref ?–4)
RBC # BLD AUTO: 3.24 10^6/UL (ref 4.3–6.1)
SODIUM SERPL-SCNC: 133 MEQ/L (ref 136–145)
T3FREE SERPL-MCNC: 2.6 PG/ML (ref 2.2–4)
T4 FREE SERPL-MCNC: 1.23 NG/DL (ref 0.76–1.46)
TRIGL SERPL-MCNC: 62 MG/DL (ref ?–150)
TSH SERPL DL<=0.005 MIU/L-ACNC: 0.63 UIU/ML (ref 0.36–3.74)
WBC # BLD AUTO: 6.1 10^3/UL (ref 4–10)

## 2023-02-23 ENCOUNTER — HOSPITAL ENCOUNTER (OUTPATIENT)
Dept: HOSPITAL 53 - M WUC | Age: 58
End: 2023-02-23
Attending: FAMILY MEDICINE
Payer: COMMERCIAL

## 2023-02-23 DIAGNOSIS — R97.20: Primary | ICD-10-CM

## 2023-04-25 ENCOUNTER — HOSPITAL ENCOUNTER (OUTPATIENT)
Dept: HOSPITAL 53 - M WUC | Age: 58
End: 2023-04-25
Attending: PSYCHIATRY & NEUROLOGY
Payer: COMMERCIAL

## 2023-04-25 DIAGNOSIS — R56.9: Primary | ICD-10-CM

## 2023-04-27 LAB
LAMOTRIGINE SERPL-MCNC: 14.5 UG/ML (ref 2–20)
LEVETIRACETAM SERPL-MCNC: 42.1 UG/ML (ref 10–40)

## 2023-08-17 ENCOUNTER — HOSPITAL ENCOUNTER (OUTPATIENT)
Dept: HOSPITAL 53 - M RAD | Age: 58
End: 2023-08-17
Attending: INTERNAL MEDICINE
Payer: COMMERCIAL

## 2023-08-17 DIAGNOSIS — Z87.891: Primary | ICD-10-CM

## 2023-12-12 ENCOUNTER — HOSPITAL ENCOUNTER (OUTPATIENT)
Dept: HOSPITAL 53 - M WUC | Age: 58
End: 2023-12-12
Attending: FAMILY MEDICINE
Payer: COMMERCIAL

## 2023-12-12 DIAGNOSIS — I10: ICD-10-CM

## 2023-12-12 DIAGNOSIS — E53.8: Primary | ICD-10-CM

## 2023-12-12 DIAGNOSIS — E03.9: ICD-10-CM

## 2023-12-12 LAB
ALBUMIN SERPL BCG-MCNC: 3.8 G/DL (ref 3.2–5.2)
ALP SERPL-CCNC: 89 U/L (ref 46–116)
ALT SERPL W P-5'-P-CCNC: 13 U/L (ref 7–40)
AST SERPL-CCNC: 29 U/L (ref ?–34)
BASOPHILS # BLD AUTO: 0.1 10^3/UL (ref 0–0.2)
BASOPHILS NFR BLD AUTO: 2.1 % (ref 0–1)
BILIRUB SERPL-MCNC: 0.5 MG/DL (ref 0.3–1.2)
BUN SERPL-MCNC: 17 MG/DL (ref 9–23)
CALCIUM SERPL-MCNC: 9.4 MG/DL (ref 8.5–10.1)
CHLORIDE SERPL-SCNC: 93 MMOL/L (ref 98–107)
CHOLEST SERPL-MCNC: 190 MG/DL (ref ?–200)
CHOLEST/HDLC SERPL: 1.62 {RATIO} (ref ?–5)
CO2 SERPL-SCNC: 32 MMOL/L (ref 20–31)
CREAT SERPL-MCNC: 1.6 MG/DL (ref 0.7–1.3)
EOSINOPHIL # BLD AUTO: 0.2 10^3/UL (ref 0–0.5)
EOSINOPHIL NFR BLD AUTO: 3.8 % (ref 0–3)
FOLATE SERPL-MCNC: 6.2 NG/ML (ref 5.4–?)
GFR SERPL CREATININE-BSD FRML MDRD: 47.3 ML/MIN/{1.73_M2} (ref 56–?)
GLUCOSE SERPL-MCNC: 89 MG/DL (ref 60–100)
HCT VFR BLD AUTO: 31.2 % (ref 42–52)
HDLC SERPL-MCNC: 117.1 MG/DL (ref 40–?)
HGB BLD-MCNC: 10.7 G/DL (ref 13.5–17.5)
LDLC SERPL CALC-MCNC: 56.7 MG/DL (ref ?–100)
LYMPHOCYTES # BLD AUTO: 0.8 10^3/UL (ref 1.5–5)
LYMPHOCYTES NFR BLD AUTO: 18 % (ref 24–44)
MCH RBC QN AUTO: 34.6 PG (ref 27–33)
MCHC RBC AUTO-ENTMCNC: 34.3 G/DL (ref 32–36.5)
MCV RBC AUTO: 101 FL (ref 80–96)
MONOCYTES # BLD AUTO: 0.5 10^3/UL (ref 0–0.8)
MONOCYTES NFR BLD AUTO: 12.6 % (ref 2–8)
NEUTROPHILS # BLD AUTO: 2.7 10^3/UL (ref 1.5–8.5)
NEUTROPHILS NFR BLD AUTO: 63 % (ref 36–66)
NONHDLC SERPL-MCNC: 72.9 MG/DL
PLATELET # BLD AUTO: 148 10^3/UL (ref 150–450)
POTASSIUM SERPL-SCNC: 4 MMOL/L (ref 3.5–5.1)
PROT SERPL-MCNC: 7 G/DL (ref 5.7–8.2)
PSA SERPL-MCNC: 4.42 NG/ML (ref ?–4)
RBC # BLD AUTO: 3.09 10^6/UL (ref 4.3–6.1)
SODIUM SERPL-SCNC: 131 MMOL/L (ref 136–145)
T3FREE SERPL-MCNC: 2.9 PG/ML (ref 2.3–4.2)
T4 FREE SERPL-MCNC: 1.46 NG/DL (ref 0.89–1.76)
TRIGL SERPL-MCNC: 81 MG/DL (ref ?–150)
TSH SERPL DL<=0.005 MIU/L-ACNC: 0.92 UIU/ML (ref 0.55–4.78)
VIT B12 SERPL-MCNC: 467 PG/ML (ref 211–911)
WBC # BLD AUTO: 4.2 10^3/UL (ref 4–10)

## 2024-03-19 ENCOUNTER — HOSPITAL ENCOUNTER (OUTPATIENT)
Dept: HOSPITAL 53 - M LABWUC | Age: 59
End: 2024-03-19
Attending: FAMILY MEDICINE
Payer: COMMERCIAL

## 2024-03-19 DIAGNOSIS — R97.20: Primary | ICD-10-CM

## 2024-04-30 ENCOUNTER — HOSPITAL ENCOUNTER (OUTPATIENT)
Dept: HOSPITAL 53 - M WUC | Age: 59
End: 2024-04-30
Attending: PSYCHIATRY & NEUROLOGY
Payer: COMMERCIAL

## 2024-04-30 DIAGNOSIS — R56.9: Primary | ICD-10-CM

## 2024-04-30 LAB
ALBUMIN SERPL BCG-MCNC: 3.8 G/DL (ref 3.2–5.2)
ALP SERPL-CCNC: 85 U/L (ref 46–116)
ALT SERPL W P-5'-P-CCNC: 17 U/L (ref 7–40)
AST SERPL-CCNC: 26 U/L (ref ?–34)
BASOPHILS # BLD AUTO: 0.1 10^3/UL (ref 0–0.2)
BASOPHILS NFR BLD AUTO: 2 % (ref 0–1)
BILIRUB SERPL-MCNC: 0.6 MG/DL (ref 0.3–1.2)
BUN SERPL-MCNC: 18 MG/DL (ref 9–23)
CALCIUM SERPL-MCNC: 9.4 MG/DL (ref 8.3–10.6)
CHLORIDE SERPL-SCNC: 95 MMOL/L (ref 98–107)
CO2 SERPL-SCNC: 32 MMOL/L (ref 20–31)
CREAT SERPL-MCNC: 1.57 MG/DL (ref 0.7–1.3)
EOSINOPHIL # BLD AUTO: 0.2 10^3/UL (ref 0–0.5)
EOSINOPHIL NFR BLD AUTO: 3.4 % (ref 0–3)
GFR SERPL CREATININE-BSD FRML MDRD: 48.2 ML/MIN/{1.73_M2} (ref 49–?)
GLUCOSE SERPL-MCNC: 79 MG/DL (ref 74–106)
HCT VFR BLD AUTO: 28.5 % (ref 42–52)
HGB BLD-MCNC: 9.7 G/DL (ref 13.5–17.5)
LYMPHOCYTES # BLD AUTO: 0.9 10^3/UL (ref 1.5–5)
LYMPHOCYTES NFR BLD AUTO: 19.5 % (ref 24–44)
MCH RBC QN AUTO: 33.9 PG (ref 27–33)
MCHC RBC AUTO-ENTMCNC: 34 G/DL (ref 32–36.5)
MCV RBC AUTO: 99.7 FL (ref 80–96)
MONOCYTES # BLD AUTO: 0.5 10^3/UL (ref 0–0.8)
MONOCYTES NFR BLD AUTO: 11 % (ref 2–8)
NEUTROPHILS # BLD AUTO: 2.8 10^3/UL (ref 1.5–8.5)
NEUTROPHILS NFR BLD AUTO: 63.7 % (ref 36–66)
PLATELET # BLD AUTO: 132 10^3/UL (ref 150–450)
POTASSIUM SERPL-SCNC: 4.3 MMOL/L (ref 3.5–5.1)
PROT SERPL-MCNC: 6.6 G/DL (ref 5.7–8.2)
RBC # BLD AUTO: 2.86 10^6/UL (ref 4.3–6.1)
SODIUM SERPL-SCNC: 132 MMOL/L (ref 136–145)
WBC # BLD AUTO: 4.5 10^3/UL (ref 4–10)

## 2024-12-10 ENCOUNTER — HOSPITAL ENCOUNTER (OUTPATIENT)
Dept: HOSPITAL 53 - M WUC | Age: 59
End: 2024-12-10
Attending: FAMILY MEDICINE
Payer: COMMERCIAL

## 2024-12-10 DIAGNOSIS — E53.8: ICD-10-CM

## 2024-12-10 DIAGNOSIS — I10: Primary | ICD-10-CM

## 2024-12-10 LAB
ALBUMIN SERPL BCG-MCNC: 3.8 G/DL (ref 3.2–5.2)
ALP SERPL-CCNC: 99 U/L (ref 40–129)
ALT SERPL W P-5'-P-CCNC: 15 U/L (ref 7–40)
AST SERPL-CCNC: 21 U/L (ref ?–34)
BASOPHILS # BLD AUTO: 0.1 10^3/UL (ref 0–0.2)
BASOPHILS NFR BLD AUTO: 1.5 % (ref 0–1)
BILIRUB SERPL-MCNC: 0.8 MG/DL (ref 0.3–1.2)
BUN SERPL-MCNC: 21 MG/DL (ref 9–23)
CALCIUM SERPL-MCNC: 9.8 MG/DL (ref 8.3–10.6)
CHLORIDE SERPL-SCNC: 98 MMOL/L (ref 98–107)
CHOLEST SERPL-MCNC: 188 MG/DL (ref ?–200)
CHOLEST/HDLC SERPL: 1.67 {RATIO} (ref ?–5)
CO2 SERPL-SCNC: 30 MMOL/L (ref 20–31)
CREAT SERPL-MCNC: 1.7 MG/DL (ref 0.7–1.3)
EOSINOPHIL # BLD AUTO: 0.2 10^3/UL (ref 0–0.5)
EOSINOPHIL NFR BLD AUTO: 3.5 % (ref 0–3)
GFR SERPL CREATININE-BSD FRML MDRD: 44 ML/MIN/{1.73_M2} (ref 49–?)
GLUCOSE SERPL-MCNC: 106 MG/DL (ref 74–106)
HCT VFR BLD AUTO: 30.3 % (ref 42–52)
HDLC SERPL-MCNC: 112 MG/DL (ref 40–?)
HGB BLD-MCNC: 10.2 G/DL (ref 13.5–17.5)
LDLC SERPL CALC-MCNC: 64.2 MG/DL (ref ?–100)
LYMPHOCYTES # BLD AUTO: 1.1 10^3/UL (ref 1.5–5)
LYMPHOCYTES NFR BLD AUTO: 18 % (ref 24–44)
MCH RBC QN AUTO: 33.7 PG (ref 27–33)
MCHC RBC AUTO-ENTMCNC: 33.7 G/DL (ref 32–36.5)
MCV RBC AUTO: 100 FL (ref 80–96)
MONOCYTES # BLD AUTO: 0.4 10^3/UL (ref 0–0.8)
MONOCYTES NFR BLD AUTO: 7.3 % (ref 2–8)
NEUTROPHILS # BLD AUTO: 4.2 10^3/UL (ref 1.5–8.5)
NEUTROPHILS NFR BLD AUTO: 69 % (ref 36–66)
NONHDLC SERPL-MCNC: 76 MG/DL
PLATELET # BLD AUTO: 173 10^3/UL (ref 150–450)
POTASSIUM SERPL-SCNC: 3.9 MMOL/L (ref 3.5–5.1)
PROT SERPL-MCNC: 7 G/DL (ref 5.7–8.2)
PSA SERPL-MCNC: 1.24 NG/ML (ref ?–4)
RBC # BLD AUTO: 3.03 10^6/UL (ref 4.3–6.1)
SODIUM SERPL-SCNC: 136 MMOL/L (ref 136–145)
T3FREE SERPL-MCNC: 2.9 PG/ML (ref 2.3–4.2)
T4 FREE SERPL-MCNC: 1.42 NG/DL (ref 0.89–1.76)
TRIGL SERPL-MCNC: 59 MG/DL (ref ?–150)
TSH SERPL DL<=0.005 MIU/L-ACNC: 1.49 UIU/ML (ref 0.55–4.78)
VIT B12 SERPL-MCNC: 336 PG/ML (ref 211–911)
WBC # BLD AUTO: 6.1 10^3/UL (ref 4–10)

## 2025-01-31 ENCOUNTER — HOSPITAL ENCOUNTER (OUTPATIENT)
Dept: HOSPITAL 53 - M RAD | Age: 60
End: 2025-01-31
Attending: FAMILY MEDICINE
Payer: COMMERCIAL

## 2025-01-31 DIAGNOSIS — Z87.891: Primary | ICD-10-CM
